# Patient Record
Sex: FEMALE
[De-identification: names, ages, dates, MRNs, and addresses within clinical notes are randomized per-mention and may not be internally consistent; named-entity substitution may affect disease eponyms.]

---

## 2021-06-01 NOTE — NUR
06/01/21 1029 Angela Parekh
1 MG EPI ADDED TO EACH OF THE FIRST 3 BAGS OF LR FOR IRRIGATION PER
ORDER

## 2021-06-01 NOTE — NUR
06/01/21 1148 Fernanda Cheung
SATS ARE 96% ON RA. PT SITTING UP IN RECLINER. RN PROVIDED TEACHING ON
 INCENTIVE SPIROMETER. PT RETURNED DEMONSTRATION X5.

## 2023-02-13 ENCOUNTER — HOSPITAL ENCOUNTER (OUTPATIENT)
Dept: HOSPITAL 95 - LAB SHORT | Age: 68
Discharge: HOME | End: 2023-02-13
Attending: FAMILY MEDICINE
Payer: MEDICARE

## 2023-02-13 DIAGNOSIS — E61.1: Primary | ICD-10-CM

## 2023-02-13 DIAGNOSIS — R25.2: ICD-10-CM

## 2023-02-13 DIAGNOSIS — E53.8: ICD-10-CM

## 2023-02-13 LAB
ALBUMIN SERPL BCP-MCNC: 3.6 G/DL (ref 3.4–5)
ALBUMIN/GLOB SERPL: 1.1 {RATIO} (ref 0.8–1.8)
ALT SERPL W P-5'-P-CCNC: 70 U/L (ref 12–78)
ANION GAP SERPL CALCULATED.4IONS-SCNC: 5 MMOL/L (ref 6–16)
AST SERPL W P-5'-P-CCNC: 145 U/L (ref 12–37)
BILIRUB SERPL-MCNC: 0.7 MG/DL (ref 0.1–1)
BUN SERPL-MCNC: 16 MG/DL (ref 8–24)
CALCIUM SERPL-MCNC: 8.6 MG/DL (ref 8.5–10.1)
CHLORIDE SERPL-SCNC: 109 MMOL/L (ref 98–108)
CO2 SERPL-SCNC: 27 MMOL/L (ref 21–32)
CREAT SERPL-MCNC: 0.66 MG/DL (ref 0.4–1)
FERRITIN SERPL-MCNC: 238 NG/ML (ref 8–252)
GLOBULIN SER CALC-MCNC: 3.3 G/DL (ref 2.2–4)
GLUCOSE SERPL-MCNC: 166 MG/DL (ref 70–99)
POTASSIUM SERPL-SCNC: 4.2 MMOL/L (ref 3.5–5.5)
PROT SERPL-MCNC: 6.9 G/DL (ref 6.4–8.2)
SODIUM SERPL-SCNC: 141 MMOL/L (ref 136–145)
TIBC SERPL-MCNC: 292 UG/DL (ref 250–450)

## 2023-08-29 ENCOUNTER — HOSPITAL ENCOUNTER (OUTPATIENT)
Dept: HOSPITAL 95 - LAB SHORT | Age: 68
End: 2023-08-29
Attending: FAMILY MEDICINE
Payer: MEDICARE

## 2023-08-29 DIAGNOSIS — R25.2: ICD-10-CM

## 2023-08-29 DIAGNOSIS — I10: Primary | ICD-10-CM
